# Patient Record
Sex: FEMALE | Race: WHITE | NOT HISPANIC OR LATINO | Employment: STUDENT | URBAN - METROPOLITAN AREA
[De-identification: names, ages, dates, MRNs, and addresses within clinical notes are randomized per-mention and may not be internally consistent; named-entity substitution may affect disease eponyms.]

---

## 2023-04-19 ENCOUNTER — OFFICE VISIT (OUTPATIENT)
Dept: FAMILY MEDICINE | Facility: CLINIC | Age: 18
End: 2023-04-19
Payer: COMMERCIAL

## 2023-04-19 VITALS
DIASTOLIC BLOOD PRESSURE: 70 MMHG | SYSTOLIC BLOOD PRESSURE: 112 MMHG | BODY MASS INDEX: 28.45 KG/M2 | HEIGHT: 66 IN | TEMPERATURE: 98.8 F | HEART RATE: 90 BPM | WEIGHT: 177 LBS | RESPIRATION RATE: 18 BRPM | OXYGEN SATURATION: 99 %

## 2023-04-19 DIAGNOSIS — H61.22 IMPACTED CERUMEN OF LEFT EAR: ICD-10-CM

## 2023-04-19 DIAGNOSIS — J01.01 ACUTE RECURRENT MAXILLARY SINUSITIS: Primary | ICD-10-CM

## 2023-04-19 PROCEDURE — 99203 OFFICE O/P NEW LOW 30 MIN: CPT | Mod: 25 | Performed by: PHYSICIAN ASSISTANT

## 2023-04-19 PROCEDURE — 69210 REMOVE IMPACTED EAR WAX UNI: CPT | Mod: LT | Performed by: PHYSICIAN ASSISTANT

## 2023-04-19 RX ORDER — DROSPIRENONE AND ETHINYL ESTRADIOL 0.02-3(28)
1 KIT ORAL DAILY
COMMUNITY
Start: 2023-03-29

## 2023-04-19 ASSESSMENT — ENCOUNTER SYMPTOMS
WHEEZING: 0
SINUS PAIN: 1
CONSTITUTIONAL NEGATIVE: 1
RHINORRHEA: 1
SINUS PRESSURE: 1
SHORTNESS OF BREATH: 0
COUGH: 1

## 2023-04-19 NOTE — PROGRESS NOTES
"  Assessment & Plan     Acute recurrent maxillary sinusitis  Fluids oral decongestants steam self she should slowly improve not acutely worsen or recheck  - amoxicillin-clavulanate (AUGMENTIN) 875-125 MG tablet  Dispense: 20 tablet; Refill: 0    Impacted cerumen of left ear  Recommended Debrox or olive oil and then lavage follow if symptoms persist or worsen               BMI:   Estimated body mass index is 28.57 kg/m  as calculated from the following:    Height as of this encounter: 1.676 m (5' 6\").    Weight as of this encounter: 80.3 kg (177 lb).           NOÉ Stewart  [employer]    Dmitry Ward is a 17 year old, presenting for the following health issues:  Sinus Problem    16-year-old brought to the clinic with complaint of URI symptoms for the past 4 weeks started initially with a cough occasionally productive of mucopurulent sputum no hemoptysis no fever no chills now for the last 2 weeks she has had nasal congestion and pressure full sensation when she blows her nose that transmits to the ears.  No significant headache no stiff neck no high fevers she has no known drug allergies    Sinus Problem   Associated symptoms include congestion, sinus pressure and cough. Pertinent negatives include no shortness of breath.   History of Present Illness       Reason for visit:  Possible sinus infection  Symptom onset:  More than a month  Symptoms include:  Runny nose and conjestion  Symptom intensity:  Moderate  Symptom progression:  Staying the same  Had these symptoms before:  Yes  Has tried/received treatment for these symptoms:  Yes  Previous treatment was successful:  No  What makes it worse:  No  What makes it better:  No    She eats 2-3 servings of fruits and vegetables daily.She consumes 0 sweetened beverage(s) daily.She exercises with enough effort to increase her heart rate 60 or more minutes per day.  She exercises with enough effort to increase her heart rate 5 " "days per week.   She is taking medications regularly.           Review of Systems   Constitutional: Negative.    HENT: Positive for congestion, rhinorrhea, sinus pressure and sinus pain.    Respiratory: Positive for cough. Negative for shortness of breath and wheezing.             Objective    /70   Pulse 90   Temp 98.8  F (37.1  C)   Resp 18   Ht 1.676 m (5' 6\")   Wt 80.3 kg (177 lb)   LMP 04/01/2023   SpO2 99%   BMI 28.57 kg/m    Body mass index is 28.57 kg/m .  Physical Exam cerumen impaction I was able to remove part of it lavage and curetting visualized portion of the TM is normal right TM and canal are normal nasal mucosa is inflamed purulent rhinorrhea noted oropharynx mild injection no exudate no postnasal drip neck supple no adenopathy  Lungs clear to ventilated  Cardiovascular regular in rate and rhythm                      "